# Patient Record
Sex: FEMALE
[De-identification: names, ages, dates, MRNs, and addresses within clinical notes are randomized per-mention and may not be internally consistent; named-entity substitution may affect disease eponyms.]

---

## 2019-01-01 ENCOUNTER — APPOINTMENT (OUTPATIENT)
Dept: GERIATRICS | Facility: HOME HEALTH | Age: 74
End: 2019-01-01
Payer: MEDICARE

## 2019-01-01 VITALS — SYSTOLIC BLOOD PRESSURE: 130 MMHG | HEART RATE: 76 BPM | DIASTOLIC BLOOD PRESSURE: 80 MMHG | OXYGEN SATURATION: 99 %

## 2019-01-01 PROCEDURE — 99348 HOME/RES VST EST LOW MDM 30: CPT

## 2019-03-28 PROBLEM — Z00.00 ENCOUNTER FOR PREVENTIVE HEALTH EXAMINATION: Status: ACTIVE | Noted: 2019-03-28

## 2019-04-27 ENCOUNTER — APPOINTMENT (OUTPATIENT)
Dept: GERIATRICS | Facility: CLINIC | Age: 74
End: 2019-04-27
Payer: MEDICARE

## 2019-04-27 DIAGNOSIS — K59.09 OTHER CONSTIPATION: ICD-10-CM

## 2019-04-27 PROCEDURE — 99343: CPT

## 2019-05-01 ENCOUNTER — OTHER (OUTPATIENT)
Age: 74
End: 2019-05-01

## 2019-05-01 VITALS — DIASTOLIC BLOOD PRESSURE: 70 MMHG | OXYGEN SATURATION: 100 % | HEART RATE: 87 BPM | SYSTOLIC BLOOD PRESSURE: 105 MMHG

## 2019-05-01 PROBLEM — K59.09 CHRONIC CONSTIPATION: Status: ACTIVE | Noted: 2019-05-01

## 2019-05-01 RX ORDER — SENNOSIDES 8.6 MG/1
8.6 TABLET ORAL
Refills: 0 | Status: ACTIVE | COMMUNITY
Start: 2019-05-01

## 2019-05-01 NOTE — SOCIAL HISTORY
[No falls in past year] : Patient reported no falls in the past year [FreeTextEntry1] : lives in group home - [facility] [FreeTextEntry2] : fair [FreeTextEntry4] : none [de-identified] : fully dependent - nonverbal - needs to be  fed

## 2019-05-01 NOTE — REVIEW OF SYSTEMS
[Difficulty Walking] : difficulty walking [Incontinence] : incontinence [Negative] : Integumentary [FreeTextEntry9] : weakness on right side [de-identified] : nonverbal

## 2019-05-01 NOTE — PHYSICAL EXAM
[General Appearance - Alert] : alert [General Appearance - In No Acute Distress] : in no acute distress [General Appearance - Well Nourished] : well nourished [General Appearance - Well Developed] : well developed [Sclera] : the sclera and conjunctiva were normal [Extraocular Movements] : extraocular movements were intact [Strabismus] : no strabismus was seen [Outer Ear] : the ears and nose were normal in appearance [Examination Of The Oral Cavity] : the lips and gums were normal [Neck Appearance] : the appearance of the neck was normal [Neck Cervical Mass (___cm)] : no neck mass was observed [Jugular Venous Distention Increased] : there was no jugular-venous distention [Thyroid Diffuse Enlargement] : the thyroid was not enlarged [Thyroid Nodule] : there were no palpable thyroid nodules [] : no respiratory distress [Auscultation Breath Sounds / Voice Sounds] : lungs were clear to auscultation bilaterally [Heart Rate And Rhythm] : heart rate was normal and rhythm regular [Heart Sounds] : normal S1 and S2 [Heart Sounds Gallop] : no gallops [Murmurs] : no murmurs [Heart Sounds Pericardial Friction Rub] : no pericardial rub [Edema] : there was no peripheral edema [Abdomen Tenderness] : non-tender [Abdomen Soft] : soft [Cervical Lymph Nodes Enlarged Posterior Bilaterally] : posterior cervical [Cervical Lymph Nodes Enlarged Anterior Bilaterally] : anterior cervical [No CVA Tenderness] : no ~M costovertebral angle tenderness [No Spinal Tenderness] : no spinal tenderness [Skin Color & Pigmentation] : normal skin color and pigmentation [Skin Turgor] : normal skin turgor [Total Score ___ / 30] : the patient achieved a score of [unfilled] /30 [FreeTextEntry1] : no interaction

## 2019-05-01 NOTE — HISTORY OF PRESENT ILLNESS
[FreeTextEntry1] : pt is a 73 year old female\par resident of [facility] about two years\par asked to take on primary care as too hard to get her to prior MD\par last visit in 2017 according to notes\par did send old medical records\par some history from staff\par pt is nonverbal\par in recent months she has stopped walking and is weak on rt side\par not acute\par she is incontinent\par completely dependent

## 2019-05-01 NOTE — ASSESSMENT
[FreeTextEntry1] : 73 year old with advanced dementia\par will check blood tests\par rt hemiparesis - will start asa after lab results

## 2019-06-11 ENCOUNTER — OTHER (OUTPATIENT)
Age: 74
End: 2019-06-11

## 2019-06-11 DIAGNOSIS — M79.89 OTHER SPECIFIED SOFT TISSUE DISORDERS: ICD-10-CM

## 2019-06-14 ENCOUNTER — APPOINTMENT (OUTPATIENT)
Dept: GERIATRICS | Facility: HOME HEALTH | Age: 74
End: 2019-06-14

## 2019-07-05 ENCOUNTER — MEDICATION RENEWAL (OUTPATIENT)
Age: 74
End: 2019-07-05

## 2019-08-30 ENCOUNTER — APPOINTMENT (OUTPATIENT)
Dept: GERIATRICS | Facility: HOME HEALTH | Age: 74
End: 2019-08-30
Payer: MEDICARE

## 2019-08-30 VITALS — OXYGEN SATURATION: 97 % | DIASTOLIC BLOOD PRESSURE: 80 MMHG | SYSTOLIC BLOOD PRESSURE: 122 MMHG | HEART RATE: 96 BPM

## 2019-08-30 PROCEDURE — 99348 HOME/RES VST EST LOW MDM 30: CPT

## 2019-08-30 RX ORDER — QUETIAPINE 25 MG/1
25 TABLET, FILM COATED ORAL
Refills: 0 | Status: DISCONTINUED | COMMUNITY
Start: 2019-05-01 | End: 2019-08-30

## 2019-08-30 NOTE — ASSESSMENT
[FreeTextEntry1] : 73 year old with advanced dementia\par will check blood tests\par rt hemiparesis - will start asa after lab results\par \par 8/30/19\par pt with stage 4 decub - slowly resolving\par off seroquel\par will have flu vaccine next visit\par sister will check on pneumonia vaccine status

## 2019-08-30 NOTE — HISTORY OF PRESENT ILLNESS
[FreeTextEntry1] : pt is a 73 year old female\par resident of [facility] about two years\par asked to take on primary care as too hard to get her to prior MD\par last visit in  according to notes\par did send old medical records\par some history from staff\par pt is nonverbal\par in recent months she has stopped walking and is weak on rt side\par not acute\par she is incontinent\par completely dependent\par \par 19\par seen at [facility] residence with aide and sister\par pt was hospitalized several months ago with stage four decub\par getting wound care at [facility] and goes to wound center q 3 weeks\par the wound is packed with santyl collagenase and is slowly improving\par \par pt is off   seorquel - has script for mellatonin 3 mg qhs prn\par but has not needed it sleeps well\par eats well - puree\par pt is nonverbal\par \par sister related that pts  was a doctor - they did not how impaired she was until he  in \par she was just very quiet\par her  was a dr and Selene did EKGs iin the hospital\par she was diagnosed by Dr Williamson\par

## 2019-08-30 NOTE — REVIEW OF SYSTEMS
[Incontinence] : incontinence [Arthralgias] : arthralgias [Joint Stiffness] : joint stiffness [Joint Swelling] : joint swelling [Confused] : confusion [Skin Wound] : skin wound [Limb Weakness] : limb weakness [Difficulty Walking] : difficulty walking [Change In Personality] : personality change [Negative] : Heme/Lymph [FreeTextEntry9] : hands swollen joints - contracted - clutches ball on rt [de-identified] : decub rt hip

## 2019-08-30 NOTE — PHYSICAL EXAM
[General Appearance - In No Acute Distress] : in no acute distress [General Appearance - Alert] : alert [General Appearance - Well Nourished] : well nourished [Sclera] : the sclera and conjunctiva were normal [General Appearance - Well Developed] : well developed [Extraocular Movements] : extraocular movements were intact [Strabismus] : no strabismus was seen [Outer Ear] : the ears and nose were normal in appearance [Examination Of The Oral Cavity] : the lips and gums were normal [Neck Appearance] : the appearance of the neck was normal [Jugular Venous Distention Increased] : there was no jugular-venous distention [Neck Cervical Mass (___cm)] : no neck mass was observed [Thyroid Diffuse Enlargement] : the thyroid was not enlarged [Thyroid Nodule] : there were no palpable thyroid nodules [] : no respiratory distress [Auscultation Breath Sounds / Voice Sounds] : lungs were clear to auscultation bilaterally [Heart Sounds] : normal S1 and S2 [Heart Rate And Rhythm] : heart rate was normal and rhythm regular [Heart Sounds Gallop] : no gallops [Murmurs] : no murmurs [Edema] : there was no peripheral edema [Heart Sounds Pericardial Friction Rub] : no pericardial rub [Abdomen Tenderness] : non-tender [Abdomen Soft] : soft [Abdomen Mass (___ Cm)] : no abdominal mass palpated [Cervical Lymph Nodes Enlarged Posterior Bilaterally] : posterior cervical [Cervical Lymph Nodes Enlarged Anterior Bilaterally] : anterior cervical [No CVA Tenderness] : no ~M costovertebral angle tenderness [No Spinal Tenderness] : no spinal tenderness [Skin Color & Pigmentation] : normal skin color and pigmentation [Skin Turgor] : normal skin turgor [FreeTextEntry1] : stage 4 1-2 cm with tunneling - packed - no errythema, no foul drainage - clean

## 2019-09-25 ENCOUNTER — MEDICATION RENEWAL (OUTPATIENT)
Age: 74
End: 2019-09-25

## 2019-10-11 ENCOUNTER — APPOINTMENT (OUTPATIENT)
Dept: GERIATRICS | Facility: HOME HEALTH | Age: 74
End: 2019-10-11
Payer: MEDICARE

## 2019-10-11 PROCEDURE — 99348 HOME/RES VST EST LOW MDM 30: CPT | Mod: 25

## 2019-10-11 PROCEDURE — G0008: CPT

## 2019-10-11 PROCEDURE — 90686 IIV4 VACC NO PRSV 0.5 ML IM: CPT

## 2019-10-13 VITALS — OXYGEN SATURATION: 94 % | DIASTOLIC BLOOD PRESSURE: 60 MMHG | HEART RATE: 89 BPM | SYSTOLIC BLOOD PRESSURE: 115 MMHG

## 2019-10-13 NOTE — ASSESSMENT
[FreeTextEntry1] : 73 year old with advanced dementia\par will check blood tests\par rt hemiparesis - will start asa after lab results\par \par 8/30/19\par pt with stage 4 decub - slowly resolving\par off seroquel\par will have flu vaccine next visit\par sister will check on pneumonia vaccine status\par \par 10/11/19\par flu vaccine given\par labs reviewed\par need to check on pneumonia vaccine status\par decub is healing\par add vit D\par no need for sedatives

## 2019-10-13 NOTE — HISTORY OF PRESENT ILLNESS
[FreeTextEntry1] : pt is a 73 year old female\par resident of [facility] about two years\par asked to take on primary care as too hard to get her to prior MD\par last visit in  according to notes\par did send old medical records\par some history from staff\par pt is nonverbal\par in recent months she has stopped walking and is weak on rt side\par not acute\par she is incontinent\par completely dependent\par \par 19\par seen at [facility] residence with aide and sister\par pt was hospitalized several months ago with stage four decub\par getting wound care at [facility] and goes to wound center q 3 weeks\par the wound is packed with santyl collagenase and is slowly improving\par \par pt is off   seorquel - has script for mellatonin 3 mg qhs prn\par but has not needed it sleeps well\par eats well - puree\par pt is nonverbal\par \par sister related that pts  was a doctor - they did not how impaired she was until he  in \par she was just very quiet\par her  was a dr and Selene did EKGs iin the hospital\par she was diagnosed by Dr Williamson\par \par 10/11/19\par met we pt, sister and aide at [facility]\par decub is healing\par reviewed labs\par needs vit d - sister will obtain\par needs flu vaccine today\par need to check records re pneumonia vaccines\par does not use mellation nor seroquel - sleeps well\par

## 2019-10-13 NOTE — REVIEW OF SYSTEMS
[Incontinence] : incontinence [Joint Stiffness] : joint stiffness [Negative] : Heme/Lymph [de-identified] : pau - asphasic

## 2019-10-13 NOTE — PHYSICAL EXAM
[General Appearance - Alert] : alert [General Appearance - In No Acute Distress] : in no acute distress [General Appearance - Well Nourished] : well nourished [General Appearance - Well Developed] : well developed [Sclera] : the sclera and conjunctiva were normal [Extraocular Movements] : extraocular movements were intact [Strabismus] : no strabismus was seen [Outer Ear] : the ears and nose were normal in appearance [Examination Of The Oral Cavity] : the lips and gums were normal [Neck Appearance] : the appearance of the neck was normal [Neck Cervical Mass (___cm)] : no neck mass was observed [Jugular Venous Distention Increased] : there was no jugular-venous distention [Thyroid Diffuse Enlargement] : the thyroid was not enlarged [Thyroid Nodule] : there were no palpable thyroid nodules [] : no respiratory distress [Auscultation Breath Sounds / Voice Sounds] : lungs were clear to auscultation bilaterally [Edema] : there was no peripheral edema [Abdomen Soft] : soft [Abdomen Tenderness] : non-tender [Abdomen Mass (___ Cm)] : no abdominal mass palpated [Cervical Lymph Nodes Enlarged Posterior Bilaterally] : posterior cervical [Cervical Lymph Nodes Enlarged Anterior Bilaterally] : anterior cervical [No CVA Tenderness] : no ~M costovertebral angle tenderness [No Spinal Tenderness] : no spinal tenderness [Skin Color & Pigmentation] : normal skin color and pigmentation [Skin Turgor] : normal skin turgor [FreeTextEntry1] : stage 4 1-2 cm with tunneling - packed - no errythema, no foul drainage - clean - has lessened in size and is clean

## 2019-12-07 NOTE — HISTORY OF PRESENT ILLNESS
[FreeTextEntry1] : pt is a 73 year old female\par resident of [facility] about two years\par asked to take on primary care as too hard to get her to prior MD\par last visit in  according to notes\par did send old medical records\par some history from staff\par pt is nonverbal\par in recent months she has stopped walking and is weak on rt side\par not acute\par she is incontinent\par completely dependent\par \par 19\par seen at [facility] residence with aide and sister\par pt was hospitalized several months ago with stage four decub\par getting wound care at [facility] and goes to wound center q 3 weeks\par the wound is packed with santyl collagenase and is slowly improving\par \par pt is off   seorquel - has script for mellatonin 3 mg qhs prn\par but has not needed it sleeps well\par eats well - puree\par pt is nonverbal\par \par sister related that pts  was a doctor - they did not how impaired she was until he  in \par she was just very quiet\par her  was a dr and Selene did EKGs iin the hospital\par she was diagnosed by Dr Williamson\par \par 10/11/19\par met we pt, sister and aide at [facility]\par decub is healing\par reviewed labs\par needs vit d - sister will obtain\par needs flu vaccine today\par need to check records re pneumonia vaccines\par does not use mellation nor seroquel - sleeps well\par \par 19\par pt is now on vit D\par puree\par eats well\par sleeps well\par no pain\par BMs normal\par incontinent - doubly\par sits on roho cushion\par hip almost healed - occa wound care unit visit\par

## 2019-12-07 NOTE — REVIEW OF SYSTEMS
[Incontinence] : incontinence [Skin Wound] : skin wound [Negative] : Heme/Lymph [FreeTextEntry1] : advanced dementia

## 2019-12-07 NOTE — ASSESSMENT
[FreeTextEntry1] : 73 year old with advanced dementia\par will check blood tests\par rt hemiparesis - will start asa after lab results\par \par 8/30/19\par pt with stage 4 decub - slowly resolving\par off seroquel\par will have flu vaccine next visit\par sister will check on pneumonia vaccine status\par \par 10/11/19\par flu vaccine given\par labs reviewed\par need to check on pneumonia vaccine status\par decub is healing\par add vit D\par no need for sedatives\par \par 12/5/19\par decub virtually healed\par on vit D\par no sedatives needed\par ?pneumonia vaccines

## 2019-12-07 NOTE — PHYSICAL EXAM
[General Appearance - In No Acute Distress] : in no acute distress [General Appearance - Alert] : alert [General Appearance - Well Developed] : well developed [General Appearance - Well Nourished] : well nourished [Sclera] : the sclera and conjunctiva were normal [Extraocular Movements] : extraocular movements were intact [Strabismus] : no strabismus was seen [Outer Ear] : the ears and nose were normal in appearance [Neck Appearance] : the appearance of the neck was normal [Examination Of The Oral Cavity] : the lips and gums were normal [Neck Cervical Mass (___cm)] : no neck mass was observed [Jugular Venous Distention Increased] : there was no jugular-venous distention [Thyroid Nodule] : there were no palpable thyroid nodules [Auscultation Breath Sounds / Voice Sounds] : lungs were clear to auscultation bilaterally [] : no respiratory distress [Thyroid Diffuse Enlargement] : the thyroid was not enlarged [Heart Sounds Gallop] : no gallops [Heart Rate And Rhythm] : heart rate was normal and rhythm regular [Heart Sounds] : normal S1 and S2 [Edema] : there was no peripheral edema [Heart Sounds Pericardial Friction Rub] : no pericardial rub [Murmurs] : no murmurs [Abdomen Soft] : soft [Abdomen Mass (___ Cm)] : no abdominal mass palpated [Abdomen Tenderness] : non-tender [No CVA Tenderness] : no ~M costovertebral angle tenderness [Cervical Lymph Nodes Enlarged Anterior Bilaterally] : anterior cervical [Cervical Lymph Nodes Enlarged Posterior Bilaterally] : posterior cervical [No Spinal Tenderness] : no spinal tenderness [Skin Turgor] : normal skin turgor [Skin Color & Pigmentation] : normal skin color and pigmentation [FreeTextEntry1] : rt hemiparesis - contracted hands

## 2020-01-01 ENCOUNTER — APPOINTMENT (OUTPATIENT)
Dept: GERIATRICS | Facility: CLINIC | Age: 75
End: 2020-01-01

## 2020-01-01 ENCOUNTER — APPOINTMENT (OUTPATIENT)
Dept: GERIATRICS | Facility: HOME HEALTH | Age: 75
End: 2020-01-01
Payer: MEDICARE

## 2020-01-01 ENCOUNTER — RX RENEWAL (OUTPATIENT)
Age: 75
End: 2020-01-01

## 2020-01-01 ENCOUNTER — MEDICATION RENEWAL (OUTPATIENT)
Age: 75
End: 2020-01-01

## 2020-01-01 VITALS
TEMPERATURE: 95.7 F | DIASTOLIC BLOOD PRESSURE: 70 MMHG | OXYGEN SATURATION: 100 % | HEART RATE: 68 BPM | SYSTOLIC BLOOD PRESSURE: 110 MMHG

## 2020-01-01 VITALS
HEART RATE: 57 BPM | OXYGEN SATURATION: 100 % | DIASTOLIC BLOOD PRESSURE: 70 MMHG | SYSTOLIC BLOOD PRESSURE: 110 MMHG | TEMPERATURE: 97.7 F

## 2020-01-01 VITALS — DIASTOLIC BLOOD PRESSURE: 80 MMHG | OXYGEN SATURATION: 100 % | SYSTOLIC BLOOD PRESSURE: 120 MMHG | HEART RATE: 78 BPM

## 2020-01-01 VITALS — HEART RATE: 73 BPM | OXYGEN SATURATION: 97 % | SYSTOLIC BLOOD PRESSURE: 115 MMHG | DIASTOLIC BLOOD PRESSURE: 70 MMHG

## 2020-01-01 VITALS
OXYGEN SATURATION: 97 % | SYSTOLIC BLOOD PRESSURE: 110 MMHG | DIASTOLIC BLOOD PRESSURE: 60 MMHG | TEMPERATURE: 97.5 F | HEART RATE: 60 BPM

## 2020-01-01 DIAGNOSIS — I10 ESSENTIAL (PRIMARY) HYPERTENSION: ICD-10-CM

## 2020-01-01 DIAGNOSIS — K21.9 GASTRO-ESOPHAGEAL REFLUX DISEASE W/OUT ESOPHAGITIS: ICD-10-CM

## 2020-01-01 DIAGNOSIS — R32 UNSPECIFIED URINARY INCONTINENCE: ICD-10-CM

## 2020-01-01 DIAGNOSIS — L89.214 PRESSURE ULCER OF RIGHT HIP, STAGE 4: ICD-10-CM

## 2020-01-01 DIAGNOSIS — F32.9 MAJOR DEPRESSIVE DISORDER, SINGLE EPISODE, UNSPECIFIED: ICD-10-CM

## 2020-01-01 DIAGNOSIS — R63.4 ABNORMAL WEIGHT LOSS: ICD-10-CM

## 2020-01-01 DIAGNOSIS — Z23 ENCOUNTER FOR IMMUNIZATION: ICD-10-CM

## 2020-01-01 DIAGNOSIS — E55.9 VITAMIN D DEFICIENCY, UNSPECIFIED: ICD-10-CM

## 2020-01-01 DIAGNOSIS — G81.91 HEMIPLEGIA, UNSPECIFIED AFFECTING RIGHT DOMINANT SIDE: ICD-10-CM

## 2020-01-01 DIAGNOSIS — F03.C0 UNSPECIFIED DEMENTIA, SEVERE, WITHOUT BEHAVIORAL DISTURBANCE, PSYCHOTIC DISTURBANCE, MOOD DISTURBANCE, AND ANXIETY: ICD-10-CM

## 2020-01-01 DIAGNOSIS — U07.1 COVID-19: ICD-10-CM

## 2020-01-01 PROCEDURE — 99348 HOME/RES VST EST LOW MDM 30: CPT

## 2020-01-01 PROCEDURE — 99348 HOME/RES VST EST LOW MDM 30: CPT | Mod: CS

## 2020-01-01 PROCEDURE — ZZZZZ: CPT

## 2020-01-01 RX ORDER — PANTOPRAZOLE 40 MG/1
40 TABLET, DELAYED RELEASE ORAL DAILY
Qty: 90 | Refills: 3 | Status: ACTIVE | COMMUNITY
Start: 2019-05-01 | End: 1900-01-01

## 2020-01-01 RX ORDER — ATENOLOL 25 MG/1
25 TABLET ORAL DAILY
Qty: 90 | Refills: 3 | Status: DISCONTINUED | COMMUNITY
Start: 2019-05-01 | End: 2020-01-01

## 2020-01-01 RX ORDER — ESCITALOPRAM OXALATE 10 MG/1
10 TABLET ORAL DAILY
Qty: 90 | Refills: 3 | Status: ACTIVE | COMMUNITY
Start: 2020-01-01 | End: 1900-01-01

## 2020-01-01 RX ORDER — UBIDECARENONE/VIT E ACET 100MG-5
25 MCG CAPSULE ORAL
Refills: 0 | Status: ACTIVE | COMMUNITY
Start: 2019-10-13

## 2020-01-31 NOTE — REVIEW OF SYSTEMS
[Incontinence] : incontinence [Arthralgias] : arthralgias [Limb Weakness] : limb weakness [Confused] : confusion [Difficulty Walking] : difficulty walking [Negative] : Heme/Lymph

## 2020-01-31 NOTE — ASSESSMENT
[FreeTextEntry1] : 73 year old with advanced dementia\par will check blood tests\par rt hemiparesis - will start asa after lab results\par \par 8/30/19\par pt with stage 4 decub - slowly resolving\par off seroquel\par will have flu vaccine next visit\par sister will check on pneumonia vaccine status\par \par 10/11/19\par flu vaccine given\par labs reviewed\par need to check on pneumonia vaccine status\par decub is healing\par add vit D\par no need for sedatives\par \par 12/5/19\par decub virtually healed\par on vit D\par no sedatives needed\par ?pneumonia vaccines\par \par 1/31/20\par decub healed\par brought the prevnar vaccine -but beth wants to consult family first\par otherwise stable

## 2020-01-31 NOTE — HISTORY OF PRESENT ILLNESS
[FreeTextEntry1] : pt is a 73 year old female\par resident of [facility] about two years\par asked to take on primary care as too hard to get her to prior MD\par last visit in  according to notes\par did send old medical records\par some history from staff\par pt is nonverbal\par in recent months she has stopped walking and is weak on rt side\par not acute\par she is incontinent\par completely dependent\par \par 19\par seen at [facility] residence with aide and sister\par pt was hospitalized several months ago with stage four decub\par getting wound care at [facility] and goes to wound center q 3 weeks\par the wound is packed with santyl collagenase and is slowly improving\par \par pt is off   seorquel - has script for mellatonin 3 mg qhs prn\par but has not needed it sleeps well\par eats well - puree\par pt is nonverbal\par \par sister related that pts  was a doctor - they did not how impaired she was until he  in \par she was just very quiet\par her  was a dr and Selene did EKGs iin the hospital\par she was diagnosed by Dr Williamson\par \par 10/11/19\par met we pt, sister and aide at [facility]\par decub is healing\par reviewed labs\par needs vit d - sister will obtain\par needs flu vaccine today\par need to check records re pneumonia vaccines\par does not use mellation nor seroquel - sleeps well\par \par 19\par pt is now on vit D\par puree\par eats well\par sleeps well\par no pain\par BMs normal\par incontinent - doubly\par sits on roho cushion\par hip almost healed - occa wound care unit visit\par \par 20\par wound is cleared up\par eating well\par double incontinence\par no pain

## 2020-01-31 NOTE — REASON FOR VISIT
[Follow-Up] : a follow-up visit [Formal Caregiver] : formal caregiver [FreeTextEntry1] : f/up Alzheimers

## 2020-01-31 NOTE — PHYSICAL EXAM
[General Appearance - Alert] : alert [General Appearance - In No Acute Distress] : in no acute distress [General Appearance - Well Nourished] : well nourished [General Appearance - Well Developed] : well developed [Sclera] : the sclera and conjunctiva were normal [Extraocular Movements] : extraocular movements were intact [Normal Oral Mucosa] : normal oral mucosa [Strabismus] : no strabismus was seen [No Oral Pallor] : no oral pallor [Outer Ear] : the ears and nose were normal in appearance [No Oral Cyanosis] : no oral cyanosis [Neck Appearance] : the appearance of the neck was normal [Jugular Venous Distention Increased] : there was no jugular-venous distention [Neck Cervical Mass (___cm)] : no neck mass was observed [Thyroid Nodule] : there were no palpable thyroid nodules [Thyroid Diffuse Enlargement] : the thyroid was not enlarged [Auscultation Breath Sounds / Voice Sounds] : lungs were clear to auscultation bilaterally [Heart Rate And Rhythm] : heart rate was normal and rhythm regular [] : no respiratory distress [Heart Sounds] : normal S1 and S2 [Heart Sounds Gallop] : no gallops [Murmurs] : no murmurs [Abdomen Soft] : soft [Heart Sounds Pericardial Friction Rub] : no pericardial rub [Edema] : there was no peripheral edema [Abdomen Tenderness] : non-tender [Abdomen Mass (___ Cm)] : no abdominal mass palpated [Cervical Lymph Nodes Enlarged Posterior Bilaterally] : posterior cervical [Cervical Lymph Nodes Enlarged Anterior Bilaterally] : anterior cervical [No Spinal Tenderness] : no spinal tenderness [No CVA Tenderness] : no ~M costovertebral angle tenderness [Skin Color & Pigmentation] : normal skin color and pigmentation [Skin Turgor] : normal skin turgor [FreeTextEntry1] : rt hemiparesis - contracted hands - nonverbal

## 2020-03-31 NOTE — HISTORY OF PRESENT ILLNESS
[FreeTextEntry1] : pt is a 73 year old female\par resident of [facility] about two years\par asked to take on primary care as too hard to get her to prior MD\par last visit in  according to notes\par did send old medical records\par some history from staff\par pt is nonverbal\par in recent months she has stopped walking and is weak on rt side\par not acute\par she is incontinent\par completely dependent\par \par 19\par seen at [facility] residence with aide and sister\par pt was hospitalized several months ago with stage four decub\par getting wound care at [facility] and goes to wound center q 3 weeks\par the wound is packed with santyl collagenase and is slowly improving\par \par pt is off   seorquel - has script for mellatonin 3 mg qhs prn\par but has not needed it sleeps well\par eats well - puree\par pt is nonverbal\par \par sister related that pts  was a doctor - they did not how impaired she was until he  in \par she was just very quiet\par her  was a dr and Selene did EKGs iin the hospital\par she was diagnosed by Dr Williamson\par \par 10/11/19\par met we pt, sister and aide at [facility]\par decub is healing\par reviewed labs\par needs vit d - sister will obtain\par needs flu vaccine today\par need to check records re pneumonia vaccines\par does not use mellation nor seroquel - sleeps well\par \par 19\par pt is now on vit D\par puree\par eats well\par sleeps well\par no pain\par BMs normal\par incontinent - doubly\par sits on roho cushion\par hip almost healed - occa wound care unit visit\par \par 3/31/20\par pts sacral wounds have healed\par had a swelling over rt chest that self resolved without intervention\par she had some soft BMs today and yesterday\par they are holding senna and miralax\par

## 2020-03-31 NOTE — REVIEW OF SYSTEMS
[Diarrhea] : diarrhea [Incontinence] : incontinence [Confused] : confusion [Limb Weakness] : limb weakness [Difficulty Walking] : difficulty walking [Negative] : Heme/Lymph

## 2020-03-31 NOTE — PHYSICAL EXAM
[General Appearance - Alert] : alert [General Appearance - In No Acute Distress] : in no acute distress [General Appearance - Well Nourished] : well nourished [General Appearance - Well Developed] : well developed [Sclera] : the sclera and conjunctiva were normal [Extraocular Movements] : extraocular movements were intact [Strabismus] : no strabismus was seen [Normal Oral Mucosa] : normal oral mucosa [No Oral Pallor] : no oral pallor [No Oral Cyanosis] : no oral cyanosis [Outer Ear] : the ears and nose were normal in appearance [Heart Rate And Rhythm] : heart rate was normal and rhythm regular [Heart Sounds] : normal S1 and S2 [Heart Sounds Gallop] : no gallops [Murmurs] : no murmurs [Heart Sounds Pericardial Friction Rub] : no pericardial rub [Edema] : there was no peripheral edema [Abdomen Soft] : soft [Abdomen Tenderness] : non-tender [Abdomen Mass (___ Cm)] : no abdominal mass palpated [Cervical Lymph Nodes Enlarged Posterior Bilaterally] : posterior cervical [Cervical Lymph Nodes Enlarged Anterior Bilaterally] : anterior cervical [Axillary Lymph Nodes Enlarged Bilaterally] : axillary [No CVA Tenderness] : no ~M costovertebral angle tenderness [No Spinal Tenderness] : no spinal tenderness [Skin Color & Pigmentation] : normal skin color and pigmentation [Skin Turgor] : normal skin turgor [FreeTextEntry1] : rt hemiparesis - contracted hands - nonverbal

## 2020-03-31 NOTE — ASSESSMENT
[FreeTextEntry1] : 73 year old with advanced dementia\par will check blood tests\par rt hemiparesis - will start asa after lab results\par \par 8/30/19\par pt with stage 4 decub - slowly resolving\par off seroquel\par will have flu vaccine next visit\par sister will check on pneumonia vaccine status\par \par 10/11/19\par flu vaccine given\par labs reviewed\par need to check on pneumonia vaccine status\par decub is healing\par add vit D\par no need for sedatives\par \par 12/5/19\par decub virtually healed\par on vit D\par no sedatives needed\par ?pneumonia vaccines\par \par 1/31/20\par decub healed\par brought the prevnar vaccine -but beth wants to consult family first\par otherwise stable\par \par 3/31/20\par chest wall swelling has resolved\par there is small nodule on lower neck - will watch\par otherwise stable\par hold laxatives with loose bowels

## 2020-06-05 PROBLEM — I10 BENIGN ESSENTIAL HYPERTENSION: Status: ACTIVE | Noted: 2019-05-01

## 2020-06-05 PROBLEM — U07.1 COVID-19 VIRUS INFECTION: Status: ACTIVE | Noted: 2020-01-01

## 2020-06-05 PROBLEM — K21.9 GERD (GASTROESOPHAGEAL REFLUX DISEASE): Status: ACTIVE | Noted: 2019-05-01

## 2020-06-05 NOTE — PHYSICAL EXAM
[General Appearance - Alert] : alert [General Appearance - In No Acute Distress] : in no acute distress [General Appearance - Well Nourished] : well nourished [General Appearance - Well Developed] : well developed [Sclera] : the sclera and conjunctiva were normal [Extraocular Movements] : extraocular movements were intact [Strabismus] : no strabismus was seen [Normal Oral Mucosa] : normal oral mucosa [No Oral Pallor] : no oral pallor [No Oral Cyanosis] : no oral cyanosis [Outer Ear] : the ears and nose were normal in appearance [] : no respiratory distress [Auscultation Breath Sounds / Voice Sounds] : lungs were clear to auscultation bilaterally [Heart Rate And Rhythm] : heart rate was normal and rhythm regular [Heart Sounds] : normal S1 and S2 [Heart Sounds Gallop] : no gallops [Murmurs] : no murmurs [Heart Sounds Pericardial Friction Rub] : no pericardial rub [Edema] : there was no peripheral edema [Abdomen Tenderness] : non-tender [Abdomen Mass (___ Cm)] : no abdominal mass palpated [External Female Genitalia] : normal external genitalia [Cervical Lymph Nodes Enlarged Posterior Bilaterally] : posterior cervical [Cervical Lymph Nodes Enlarged Anterior Bilaterally] : anterior cervical [Axillary Lymph Nodes Enlarged Bilaterally] : axillary [No CVA Tenderness] : no ~M costovertebral angle tenderness [No Spinal Tenderness] : no spinal tenderness [Skin Color & Pigmentation] : normal skin color and pigmentation [Skin Turgor] : normal skin turgor [FreeTextEntry1] : rt hemiparesis - contracted hands - nonverbal

## 2020-06-05 NOTE — ASSESSMENT
[FreeTextEntry1] : 73 year old with advanced dementia\par will check blood tests\par rt hemiparesis - will start asa after lab results\par \par 8/30/19\par pt with stage 4 decub - slowly resolving\par off seroquel\par will have flu vaccine next visit\par sister will check on pneumonia vaccine status\par \par 10/11/19\par flu vaccine given\par labs reviewed\par need to check on pneumonia vaccine status\par decub is healing\par add vit D\par no need for sedatives\par \par 12/5/19\par decub virtually healed\par on vit D\par no sedatives needed\par ?pneumonia vaccines\par \par 1/31/20\par decub healed\par brought the prevnar vaccine -but beth wants to consult family first\par otherwise stable\par \par 3/31/20\par chest wall swelling has resolved\par there is small nodule on lower neck - will watch\par otherwise stable\par hold laxatives with loose bowels\par \par 6/5/20\par pt recovered from covid\par is totally dependent\par is eating as per aides\par no skin breakdown

## 2020-06-05 NOTE — HISTORY OF PRESENT ILLNESS
[FreeTextEntry1] : pt is a 73 year old female\par resident of [facility] about two years\par asked to take on primary care as too hard to get her to prior MD\par last visit in  according to notes\par did send old medical records\par some history from staff\par pt is nonverbal\par in recent months she has stopped walking and is weak on rt side\par not acute\par she is incontinent\par completely dependent\par \par 19\par seen at [facility] residence with aide and sister\par pt was hospitalized several months ago with stage four decub\par getting wound care at [facility] and goes to wound center q 3 weeks\par the wound is packed with santyl collagenase and is slowly improving\par \par pt is off   seorquel - has script for mellatonin 3 mg qhs prn\par but has not needed it sleeps well\par eats well - puree\par pt is nonverbal\par \par sister related that pts  was a doctor - they did not how impaired she was until he  in \par she was just very quiet\par her  was a dr and Selene did EKGs iin the hospital\par she was diagnosed by Dr Williamson\par \par 10/11/19\par met we pt, sister and aide at [facility]\par decub is healing\par reviewed labs\par needs vit d - sister will obtain\par needs flu vaccine today\par need to check records re pneumonia vaccines\par does not use mellation nor seroquel - sleeps well\par \par 19\par pt is now on vit D\par puree\par eats well\par sleeps well\par no pain\par BMs normal\par incontinent - doubly\par sits on roho cushion\par hip almost healed - occa wound care unit visit\par \par 3/31/20\par pts sacral wounds have healed\par had a swelling over rt chest that self resolved without intervention\par she had some soft BMs today and yesterday\par they are holding senna and miralax\par \par 20\par pt had covid infection in April - no symptoms - no cough, no fever\par since then tested negative\par pt is in bed - extremely stiff\par nonverbal\par is totally dependent\par does eat\par no skin breakdown

## 2020-06-05 NOTE — REVIEW OF SYSTEMS
[Recent Weight Loss (___ Lbs)] : recent [unfilled] ~Ulb weight loss [Incontinence] : incontinence [Negative] : Heme/Lymph [FreeTextEntry9] : stiff

## 2020-07-31 PROBLEM — E55.9 HYPOVITAMINOSIS D: Status: ACTIVE | Noted: 2019-10-13

## 2020-07-31 PROBLEM — L89.214 PRESSURE INJURY OF RIGHT HIP, STAGE 4: Status: ACTIVE | Noted: 2019-08-30

## 2020-07-31 PROBLEM — R32 INCONTINENCE: Status: ACTIVE | Noted: 2019-05-01

## 2020-07-31 PROBLEM — R63.4 ABNORMAL WEIGHT LOSS: Status: ACTIVE | Noted: 2020-01-01

## 2020-07-31 NOTE — HISTORY OF PRESENT ILLNESS
[FreeTextEntry1] : pt is a 73 year old female\par resident of [facility] about two years\par asked to take on primary care as too hard to get her to prior MD\par last visit in  according to notes\par did send old medical records\par some history from staff\par pt is nonverbal\par in recent months she has stopped walking and is weak on rt side\par not acute\par she is incontinent\par completely dependent\par \par 19\par seen at [facility] residence with aide and sister\par pt was hospitalized several months ago with stage four decub\par getting wound care at [facility] and goes to wound center q 3 weeks\par the wound is packed with santyl collagenase and is slowly improving\par \par pt is off   seorquel - has script for mellatonin 3 mg qhs prn\par but has not needed it sleeps well\par eats well - puree\par pt is nonverbal\par \par sister related that pts  was a doctor - they did not how impaired she was until he  in \par she was just very quiet\par her  was a dr and Selene did EKGs iin the hospital\par she was diagnosed by Dr Williamson\par \par 10/11/19\par met we pt, sister and aide at [facility]\par decub is healing\par reviewed labs\par needs vit d - sister will obtain\par needs flu vaccine today\par need to check records re pneumonia vaccines\par does not use mellation nor seroquel - sleeps well\par \par 19\par pt is now on vit D\par puree\par eats well\par sleeps well\par no pain\par BMs normal\par incontinent - doubly\par sits on roho cushion\par hip almost healed - occa wound care unit visit\par \par 3/31/20\par pts sacral wounds have healed\par had a swelling over rt chest that self resolved without intervention\par she had some soft BMs today and yesterday\par they are holding senna and miralax\par \par 20\par pt had covid infection in April - no symptoms - no cough, no fever\par since then tested negative\par pt is in bed - extremely stiff\par nonverbal\par is totally dependent\par does eat\par no skin breakdown\par \par 20\par pt is seen at home\par she is in bed - nonintereactive\par extremely cachetic - contracted and stiff\par labs were done at home, reviewed and copy left at center\par the decub is almost completely healed\par she does drink ensure and eat puree as per staff\par observed to eat small amount of oatmeal\par she does not appear to be in pain\par she is completely incontinent\par completely dependent\par unable to move on her own\par no eye contact nor response to any stimuli\par eyes are open\par no longer on atenolol

## 2020-07-31 NOTE — ASSESSMENT
[FreeTextEntry1] : 73 year old with advanced dementia\par will check blood tests\par rt hemiparesis - will start asa after lab results\par \par 8/30/19\par pt with stage 4 decub - slowly resolving\par off seroquel\par will have flu vaccine next visit\par sister will check on pneumonia vaccine status\par \par 10/11/19\par flu vaccine given\par labs reviewed\par need to check on pneumonia vaccine status\par decub is healing\par add vit D\par no need for sedatives\par \par 12/5/19\par decub virtually healed\par on vit D\par no sedatives needed\par ?pneumonia vaccines\par \par 1/31/20\par decub healed\par brought the prevnar vaccine -but beth wants to consult family first\par otherwise stable\par \par 3/31/20\par chest wall swelling has resolved\par there is small nodule on lower neck - will watch\par otherwise stable\par hold laxatives with loose bowels\par \par 6/5/20\par pt recovered from covid\par is totally dependent\par is eating as per aides\par no skin breakdown\par \par 7/30/20\par pt with advanced dementia\par contractures\par bed bound\par completely dependent\par prognosis less than 6 months\par home DNR\par appropriate for hospice\par family aware

## 2020-07-31 NOTE — PHYSICAL EXAM
[General Appearance - Alert] : alert [General Appearance - In No Acute Distress] : in no acute distress [Sclera] : the sclera and conjunctiva were normal [Strabismus] : no strabismus was seen [Extraocular Movements] : extraocular movements were intact [No Oral Pallor] : no oral pallor [Normal Oral Mucosa] : normal oral mucosa [Outer Ear] : the ears and nose were normal in appearance [No Oral Cyanosis] : no oral cyanosis [] : no respiratory distress [Auscultation Breath Sounds / Voice Sounds] : lungs were clear to auscultation bilaterally [Heart Rate And Rhythm] : heart rate was normal and rhythm regular [Heart Sounds] : normal S1 and S2 [Heart Sounds Gallop] : no gallops [Murmurs] : no murmurs [Heart Sounds Pericardial Friction Rub] : no pericardial rub [Edema] : there was no peripheral edema [Abdomen Tenderness] : non-tender [Abdomen Mass (___ Cm)] : no abdominal mass palpated [External Female Genitalia] : normal external genitalia [Cervical Lymph Nodes Enlarged Posterior Bilaterally] : posterior cervical [Cervical Lymph Nodes Enlarged Anterior Bilaterally] : anterior cervical [Axillary Lymph Nodes Enlarged Bilaterally] : axillary [No CVA Tenderness] : no ~M costovertebral angle tenderness [No Spinal Tenderness] : no spinal tenderness [Skin Color & Pigmentation] : normal skin color and pigmentation [Involuntary Movements] : no involuntary movements were seen [Skin Turgor] : normal skin turgor [FreeTextEntry1] : rt hemiparesis - contracted hands - nonverbal

## 2020-09-20 PROBLEM — Z23 ENCOUNTER FOR ADMINISTRATION OF VACCINE: Status: ACTIVE | Noted: 2019-10-11

## 2020-09-20 PROBLEM — G81.91 RIGHT HEMIPARESIS: Status: ACTIVE | Noted: 2019-05-01

## 2020-09-20 PROBLEM — F32.9 DEPRESSION: Status: ACTIVE | Noted: 2019-05-01

## 2020-09-20 NOTE — ASSESSMENT
[FreeTextEntry1] : 73 year old with advanced dementia\par will check blood tests\par rt hemiparesis - will start asa after lab results\par \par 8/30/19\par pt with stage 4 decub - slowly resolving\par off seroquel\par will have flu vaccine next visit\par sister will check on pneumonia vaccine status\par \par 10/11/19\par flu vaccine given\par labs reviewed\par need to check on pneumonia vaccine status\par decub is healing\par add vit D\par no need for sedatives\par \par 12/5/19\par decub virtually healed\par on vit D\par no sedatives needed\par ?pneumonia vaccines\par \par 1/31/20\par decub healed\par brought the prevnar vaccine -but beth wants to consult family first\par otherwise stable\par \par 3/31/20\par chest wall swelling has resolved\par there is small nodule on lower neck - will watch\par otherwise stable\par hold laxatives with loose bowels\par \par 6/5/20\par pt recovered from covid\par is totally dependent\par is eating as per aides\par no skin breakdown\par \par 7/30/20\par pt with advanced dementia\par contractures\par bed bound\par completely dependent\par prognosis less than 6 months\par home DNR\par appropriate for hospice\par family aware\par \par 9/16/20\par pt remains stable\par off atenolol\par not on hospice\par skin healed\par gave flu vaccine

## 2020-09-20 NOTE — REVIEW OF SYSTEMS
[Recent Weight Loss (___ Lbs)] : recent [unfilled] ~Ulb weight loss [Incontinence] : incontinence [Confused] : confusion [Limb Weakness] : limb weakness [Difficulty Walking] : difficulty walking [Negative] : Heme/Lymph

## 2020-09-20 NOTE — PHYSICAL EXAM
[General Appearance - Alert] : alert [General Appearance - In No Acute Distress] : in no acute distress [Sclera] : the sclera and conjunctiva were normal [Extraocular Movements] : extraocular movements were intact [Strabismus] : no strabismus was seen [Normal Oral Mucosa] : normal oral mucosa [No Oral Pallor] : no oral pallor [No Oral Cyanosis] : no oral cyanosis [Outer Ear] : the ears and nose were normal in appearance [] : no respiratory distress [Auscultation Breath Sounds / Voice Sounds] : lungs were clear to auscultation bilaterally [Heart Rate And Rhythm] : heart rate was normal and rhythm regular [Heart Sounds] : normal S1 and S2 [Heart Sounds Gallop] : no gallops [Murmurs] : no murmurs [Heart Sounds Pericardial Friction Rub] : no pericardial rub [Edema] : there was no peripheral edema [Abdomen Tenderness] : non-tender [Abdomen Mass (___ Cm)] : no abdominal mass palpated [External Female Genitalia] : normal external genitalia [Cervical Lymph Nodes Enlarged Posterior Bilaterally] : posterior cervical [Cervical Lymph Nodes Enlarged Anterior Bilaterally] : anterior cervical [Axillary Lymph Nodes Enlarged Bilaterally] : axillary [No CVA Tenderness] : no ~M costovertebral angle tenderness [No Spinal Tenderness] : no spinal tenderness [Involuntary Movements] : no involuntary movements were seen [Skin Color & Pigmentation] : normal skin color and pigmentation [Skin Turgor] : normal skin turgor [FreeTextEntry1] : no interaction

## 2020-11-12 ENCOUNTER — APPOINTMENT (OUTPATIENT)
Dept: GERIATRICS | Facility: HOME HEALTH | Age: 75
End: 2020-11-12

## 2023-04-05 PROBLEM — F03.C0 ADVANCED DEMENTIA: Status: ACTIVE | Noted: 2019-05-01
